# Patient Record
Sex: MALE | Race: WHITE | NOT HISPANIC OR LATINO | Employment: FULL TIME | URBAN - METROPOLITAN AREA
[De-identification: names, ages, dates, MRNs, and addresses within clinical notes are randomized per-mention and may not be internally consistent; named-entity substitution may affect disease eponyms.]

---

## 2023-11-30 ENCOUNTER — APPOINTMENT (OUTPATIENT)
Dept: ENDOCRINOLOGY | Facility: CLINIC | Age: 53
End: 2023-11-30
Payer: COMMERCIAL

## 2023-12-20 ENCOUNTER — APPOINTMENT (OUTPATIENT)
Dept: ENDOCRINOLOGY | Facility: CLINIC | Age: 53
End: 2023-12-20
Payer: COMMERCIAL

## 2023-12-20 NOTE — PROGRESS NOTES
Subjective   Eliecer Asher is a 53 y.o. male who presents for follow-up of Type 2 diabetes mellitus. The initial diagnosis of diabetes was made {ago/age:94797}. The patient {DOES/ DOES NOT:81335} have a known family history of diabetes.    Known complications due to diabetes included {Diagnoses; complications diabetes:1215}    Cardiovascular risk factors include {risk factors heart disease:510}. The patient {is/is not:98354} on an ACE inhibitor or angiotensin II receptor blocker.  The patient {HAS HAS NOT:98199} been previously hospitalized due to diabetic ketoacidosis.     Current symptoms/problems include {Symptoms; diabetes:86286}. His {course:85841}.     Current diabetes regimen is as follows: {therapy; diabetes meds:12240}     The patient {is/is not:48653} currently checking the blood glucose *** times per day.    Patient is using: {glucometer/continuous glucose monitor:37135}    Hypoglycemia frequency: ***  Hypoglycemia awareness: {YES/NO:63646}    Current Medication RegimenÂ    Metformin 1000mg twice daily   Basaglar 21 units SQ bedtime (decreased from 23 units as his sugar values were in the 80s)  Atorvastatin 20 mg once daily  Lisinopril 5 mg once daily     MEALS: only eats when hungry   Breakfast - cereal   Lunch - can omit  DinnerÂ Â - ribs, potato salad, green beans   Snacks- yogurt, banana, chocolate pudding, turkey sandwich   Beverages - water, coffee, occasional soda     Denies exercise regimen     Tobacco use - under 1 ppd and vapes     Review of Systems    Objective   There were no vitals taken for this visit.  Physical Exam    Lab Review  Glucose (mg/dL)   Date Value   11/18/2022 124 (H)   12/23/2021 120 (H)   01/20/2021 156 (H)     Hemoglobin A1C (%)   Date Value   11/18/2022 7.5 (A)   12/23/2021 7.4 (A)   01/20/2021 8.7     Bicarbonate (mmol/L)   Date Value   11/18/2022 30   12/23/2021 32   01/20/2021 30     Urea Nitrogen (mg/dL)   Date Value   11/18/2022 7   12/23/2021 6   01/20/2021 6      Creatinine (mg/dL)   Date Value   11/18/2022 0.74   12/23/2021 0.82   01/20/2021 0.80       Health Maintenance:   Foot Exam:   Eye Exam:  Lipid Panel:  Urine Albumin:    Assessment/Plan      52-year-old male presents for follow up type 2 diabetes. His last hemoglobin A1c was found to be 6.3% as of today. He is noted to be on a statin. He is noted to be on an ACE inhibitor. His blood pressure is at goal

## 2024-04-11 NOTE — PROGRESS NOTES
"Subjective   Eliecer Asher is a 53 y.o. male who presents for follow-up of Type 2 diabetes mellitus. The initial diagnosis of diabetes was made in 2016 .     Hiw wife passed away in January 2024 after being sick for a year.  There was a period of time when he was non adherent with insulin therapy due to grief.    He works for [employer].  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      Known complications due to diabetes included none    Cardiovascular risk factors include advanced age (older than 55 for men, 65 for women), diabetes mellitus, and male gender and tobacco use. The patient is on an ACE inhibitor or angiotensin II receptor blocker.  The patient has not been previously hospitalized due to diabetic ketoacidosis.     Current symptoms/problems include none. His clinical course has been stable.     The patient is not currently checking the blood glucose.     Patient is using: glucometer    Hypoglycemia frequency: Denies   Hypoglycemia awareness: N/A     Current Medication Regimen  Metformin 1000mg twice daily   Lantus 20 units SQ bedtime     MEALS:   Company pays  Increased BBQ, salad  Desserts - denies  Beverages - regular coke    Denies exercise regimen     Tobacco use - under 1 ppd and vapes     Review of Systems   Respiratory:  Negative for cough and shortness of breath.    Cardiovascular:  Negative for chest pain.   All other systems reviewed and are negative.      Objective   /90 (BP Location: Left arm, Patient Position: Sitting, BP Cuff Size: Adult)   Pulse 70   Ht 1.765 m (5' 9.5\")   Wt 85.7 kg (189 lb)   BMI 27.51 kg/m²   Physical Exam  Vitals and nursing note reviewed.   Constitutional:       General: He is not in acute distress.     Appearance: Normal appearance. He is normal weight.   HENT:      Head: Normocephalic and atraumatic.      Nose: Nose normal.      Mouth/Throat:      Mouth: Mucous membranes are moist.   Eyes:      " "Extraocular Movements: Extraocular movements intact.   Cardiovascular:      Rate and Rhythm: Normal rate and regular rhythm.   Pulmonary:      Effort: Pulmonary effort is normal.      Breath sounds: Normal breath sounds.   Musculoskeletal:         General: Normal range of motion.   Feet:      Right foot:      Skin integrity: Dry skin present.      Toenail Condition: Right toenails are abnormally thick. Fungal disease present.     Left foot:      Skin integrity: Dry skin present.      Toenail Condition: Left toenails are abnormally thick. Fungal disease present.     Comments: Discoloration to left distal foot   Skin:     General: Skin is warm.   Neurological:      Mental Status: He is alert and oriented to person, place, and time.   Psychiatric:         Mood and Affect: Mood normal.       Lab Review  Glucose (mg/dL)   Date Value   11/18/2022 124 (H)   12/23/2021 120 (H)   01/20/2021 156 (H)     Hemoglobin A1C (%)   Date Value   11/18/2022 7.5 (A)   12/23/2021 7.4 (A)   01/20/2021 8.7     Bicarbonate (mmol/L)   Date Value   11/18/2022 30   12/23/2021 32   01/20/2021 30     Urea Nitrogen (mg/dL)   Date Value   11/18/2022 7   12/23/2021 6   01/20/2021 6     Creatinine (mg/dL)   Date Value   11/18/2022 0.74   12/23/2021 0.82   01/20/2021 0.80     Lab Results   Component Value Date    CHOL 127 11/18/2022    CHOL 150 01/20/2021    CHOL 225 (H) 09/10/2020     Lab Results   Component Value Date    HDL 47.4 11/18/2022    HDL 47.4 01/20/2021    HDL 46.4 09/10/2020     No results found for: \"LDLCALC\"  Lab Results   Component Value Date    TRIG 125 11/18/2022    TRIG 143 01/20/2021    TRIG 334 (H) 09/10/2020       Health Maintenance:   Foot Exam:  May 2023  Eye Exam:  April 1, 2022  Urine Albumin: November 18, 2022    Assessment/Plan   53-year-old male presents for follow up type 2 diabetes. He is noted to be on a statin. He is noted to be on an ACE inhibitor. His blood pressure is at goal     Type 2 diabetes mellitus " (Multi)  To continue Metformin 1000mg twice daily   To continue Lantus 20 units SQ daily  For a diabetic dilated eye examination  Please keep feet moisturized and inspect daily  To obtain blood and urine tests today            Long-term insulin use (Multi)  Please rotate insulin injection sites    For follow up in 6 months with glucose log

## 2024-04-11 NOTE — PATIENT INSTRUCTIONS
Thank you for choosing [facility]  for your health care needs.  If you have any questions, concerns or medical needs, please feel free to contact our office at [phone].    Please ensure you complete your blood work one week before the next scheduled appointment.    To continue Metformin 1000mg twice daily   To continue Lantus 20 units SQ daily  For a diabetic dilated eye examination  Please keep feet moisturized and inspect daily  For follow up in 6 months with glucose log

## 2024-04-12 ENCOUNTER — OFFICE VISIT (OUTPATIENT)
Dept: ENDOCRINOLOGY | Facility: CLINIC | Age: 54
End: 2024-04-12
Payer: COMMERCIAL

## 2024-04-12 VITALS
BODY MASS INDEX: 27.06 KG/M2 | HEART RATE: 70 BPM | SYSTOLIC BLOOD PRESSURE: 122 MMHG | WEIGHT: 189 LBS | DIASTOLIC BLOOD PRESSURE: 90 MMHG | HEIGHT: 70 IN

## 2024-04-12 DIAGNOSIS — Z79.4 TYPE 2 DIABETES MELLITUS WITHOUT COMPLICATION, WITH LONG-TERM CURRENT USE OF INSULIN (MULTI): Primary | ICD-10-CM

## 2024-04-12 DIAGNOSIS — E11.9 TYPE 2 DIABETES MELLITUS WITHOUT COMPLICATION, WITH LONG-TERM CURRENT USE OF INSULIN (MULTI): Primary | ICD-10-CM

## 2024-04-12 LAB — POC FINGERSTICK BLOOD GLUCOSE: 251 MG/DL (ref 70–100)

## 2024-04-12 PROCEDURE — 3080F DIAST BP >= 90 MM HG: CPT | Performed by: INTERNAL MEDICINE

## 2024-04-12 PROCEDURE — 3074F SYST BP LT 130 MM HG: CPT | Performed by: INTERNAL MEDICINE

## 2024-04-12 PROCEDURE — 82962 GLUCOSE BLOOD TEST: CPT | Performed by: INTERNAL MEDICINE

## 2024-04-12 PROCEDURE — 99214 OFFICE O/P EST MOD 30 MIN: CPT | Performed by: INTERNAL MEDICINE

## 2024-04-12 PROCEDURE — 4010F ACE/ARB THERAPY RXD/TAKEN: CPT | Performed by: INTERNAL MEDICINE

## 2024-04-12 RX ORDER — ATORVASTATIN CALCIUM 10 MG/1
TABLET, FILM COATED ORAL EVERY 24 HOURS
COMMUNITY

## 2024-04-12 RX ORDER — INSULIN GLARGINE 100 [IU]/ML
INJECTION, SOLUTION SUBCUTANEOUS EVERY 24 HOURS
COMMUNITY
Start: 2019-01-03

## 2024-04-12 RX ORDER — METFORMIN HYDROCHLORIDE 1000 MG/1
1000 TABLET ORAL 2 TIMES DAILY
COMMUNITY

## 2024-04-12 RX ORDER — PROPRANOLOL HYDROCHLORIDE 160 MG/1
160 CAPSULE, EXTENDED RELEASE ORAL DAILY
COMMUNITY

## 2024-04-12 ASSESSMENT — ENCOUNTER SYMPTOMS
COUGH: 0
SHORTNESS OF BREATH: 0

## 2024-04-14 PROBLEM — Z79.4 LONG-TERM INSULIN USE (MULTI): Status: ACTIVE | Noted: 2024-04-14

## 2024-04-14 PROBLEM — E11.9 TYPE 2 DIABETES MELLITUS (MULTI): Status: ACTIVE | Noted: 2024-04-14

## 2024-04-14 NOTE — ASSESSMENT & PLAN NOTE
To continue Metformin 1000mg twice daily   To continue Lantus 20 units SQ daily  For a diabetic dilated eye examination  Please keep feet moisturized and inspect daily  To obtain blood and urine tests today

## 2024-08-14 DIAGNOSIS — Z79.4 TYPE 2 DIABETES MELLITUS WITHOUT COMPLICATION, WITH LONG-TERM CURRENT USE OF INSULIN (MULTI): Primary | ICD-10-CM

## 2024-08-14 DIAGNOSIS — E11.9 TYPE 2 DIABETES MELLITUS WITHOUT COMPLICATION, WITH LONG-TERM CURRENT USE OF INSULIN (MULTI): Primary | ICD-10-CM

## 2024-08-14 RX ORDER — INSULIN GLARGINE 100 [IU]/ML
20 INJECTION, SOLUTION SUBCUTANEOUS NIGHTLY
Qty: 10 EACH | Refills: 5 | Status: SHIPPED | OUTPATIENT
Start: 2024-08-14 | End: 2025-02-10

## 2024-08-14 RX ORDER — METFORMIN HYDROCHLORIDE 1000 MG/1
1000 TABLET ORAL 2 TIMES DAILY
Qty: 180 TABLET | Refills: 1 | Status: SHIPPED | OUTPATIENT
Start: 2024-08-14 | End: 2025-02-10

## 2024-10-14 ENCOUNTER — APPOINTMENT (OUTPATIENT)
Dept: ENDOCRINOLOGY | Facility: CLINIC | Age: 54
End: 2024-10-14
Payer: COMMERCIAL

## 2025-03-25 DIAGNOSIS — Z79.4 TYPE 2 DIABETES MELLITUS WITHOUT COMPLICATION, WITH LONG-TERM CURRENT USE OF INSULIN: ICD-10-CM

## 2025-03-25 DIAGNOSIS — E11.9 TYPE 2 DIABETES MELLITUS WITHOUT COMPLICATION, WITH LONG-TERM CURRENT USE OF INSULIN: ICD-10-CM

## 2025-03-25 RX ORDER — METFORMIN HYDROCHLORIDE 1000 MG/1
1000 TABLET ORAL 2 TIMES DAILY
Qty: 180 TABLET | Refills: 1 | Status: SHIPPED | OUTPATIENT
Start: 2025-03-25 | End: 2025-09-21

## 2025-03-25 NOTE — TELEPHONE ENCOUNTER
(Next appt 8/21/2025)    Patient need refill on metformin sent to [facility]. 90 day supply    Patient also will like to have blood work completed before next appointment.

## 2025-08-21 ENCOUNTER — APPOINTMENT (OUTPATIENT)
Dept: ENDOCRINOLOGY | Facility: CLINIC | Age: 55
End: 2025-08-21
Payer: COMMERCIAL

## 2025-08-21 VITALS
HEART RATE: 64 BPM | HEIGHT: 70 IN | BODY MASS INDEX: 27.77 KG/M2 | DIASTOLIC BLOOD PRESSURE: 70 MMHG | WEIGHT: 194 LBS | SYSTOLIC BLOOD PRESSURE: 128 MMHG

## 2025-08-21 DIAGNOSIS — Z79.4 LONG-TERM INSULIN USE (MULTI): ICD-10-CM

## 2025-08-21 DIAGNOSIS — Z79.4 TYPE 2 DIABETES MELLITUS WITHOUT COMPLICATION, WITH LONG-TERM CURRENT USE OF INSULIN: Primary | ICD-10-CM

## 2025-08-21 DIAGNOSIS — E11.9 TYPE 2 DIABETES MELLITUS WITHOUT COMPLICATION, WITH LONG-TERM CURRENT USE OF INSULIN: Primary | ICD-10-CM

## 2025-08-21 LAB — POC FINGERSTICK BLOOD GLUCOSE: 199 MG/DL (ref 70–100)

## 2025-08-21 PROCEDURE — 99214 OFFICE O/P EST MOD 30 MIN: CPT | Performed by: INTERNAL MEDICINE

## 2025-08-21 PROCEDURE — 3078F DIAST BP <80 MM HG: CPT | Performed by: INTERNAL MEDICINE

## 2025-08-21 PROCEDURE — 4010F ACE/ARB THERAPY RXD/TAKEN: CPT | Performed by: INTERNAL MEDICINE

## 2025-08-21 PROCEDURE — 3008F BODY MASS INDEX DOCD: CPT | Performed by: INTERNAL MEDICINE

## 2025-08-21 PROCEDURE — 82962 GLUCOSE BLOOD TEST: CPT | Performed by: INTERNAL MEDICINE

## 2025-08-21 PROCEDURE — 3074F SYST BP LT 130 MM HG: CPT | Performed by: INTERNAL MEDICINE

## 2025-08-21 RX ORDER — INSULIN GLARGINE 100 [IU]/ML
20 INJECTION, SOLUTION SUBCUTANEOUS NIGHTLY
Qty: 10 EACH | Refills: 5 | Status: SHIPPED | OUTPATIENT
Start: 2025-08-21 | End: 2026-02-17

## 2025-08-21 RX ORDER — BLOOD-GLUCOSE SENSOR
EACH MISCELLANEOUS
Qty: 6 EACH | Refills: 3 | Status: SHIPPED | OUTPATIENT
Start: 2025-08-21

## 2025-08-21 RX ORDER — METFORMIN HYDROCHLORIDE 1000 MG/1
1000 TABLET ORAL 2 TIMES DAILY
Qty: 180 TABLET | Refills: 1 | Status: SHIPPED | OUTPATIENT
Start: 2025-08-21 | End: 2026-02-17

## 2025-08-21 ASSESSMENT — PATIENT HEALTH QUESTIONNAIRE - PHQ9
1. LITTLE INTEREST OR PLEASURE IN DOING THINGS: NOT AT ALL
2. FEELING DOWN, DEPRESSED OR HOPELESS: NOT AT ALL
SUM OF ALL RESPONSES TO PHQ9 QUESTIONS 1 AND 2: 0

## 2025-08-21 ASSESSMENT — COLUMBIA-SUICIDE SEVERITY RATING SCALE - C-SSRS
2. HAVE YOU ACTUALLY HAD ANY THOUGHTS OF KILLING YOURSELF?: NO
6. HAVE YOU EVER DONE ANYTHING, STARTED TO DO ANYTHING, OR PREPARED TO DO ANYTHING TO END YOUR LIFE?: NO
1. IN THE PAST MONTH, HAVE YOU WISHED YOU WERE DEAD OR WISHED YOU COULD GO TO SLEEP AND NOT WAKE UP?: NO

## 2025-08-25 DIAGNOSIS — E11.9 TYPE 2 DIABETES MELLITUS WITHOUT COMPLICATION, WITH LONG-TERM CURRENT USE OF INSULIN: ICD-10-CM

## 2025-08-25 DIAGNOSIS — Z79.4 TYPE 2 DIABETES MELLITUS WITHOUT COMPLICATION, WITH LONG-TERM CURRENT USE OF INSULIN: ICD-10-CM

## 2025-09-05 ENCOUNTER — APPOINTMENT (OUTPATIENT)
Dept: ENDOCRINOLOGY | Facility: CLINIC | Age: 55
End: 2025-09-05
Payer: COMMERCIAL

## 2026-03-05 ENCOUNTER — APPOINTMENT (OUTPATIENT)
Dept: ENDOCRINOLOGY | Facility: CLINIC | Age: 56
End: 2026-03-05
Payer: COMMERCIAL